# Patient Record
Sex: MALE | Race: WHITE | NOT HISPANIC OR LATINO | Employment: OTHER | URBAN - METROPOLITAN AREA
[De-identification: names, ages, dates, MRNs, and addresses within clinical notes are randomized per-mention and may not be internally consistent; named-entity substitution may affect disease eponyms.]

---

## 2019-08-23 ENCOUNTER — HOSPITAL ENCOUNTER (EMERGENCY)
Facility: HOSPITAL | Age: 19
Discharge: HOME | End: 2019-08-23
Attending: EMERGENCY MEDICINE | Admitting: EMERGENCY MEDICINE
Payer: COMMERCIAL

## 2019-08-23 ENCOUNTER — APPOINTMENT (EMERGENCY)
Dept: CT IMAGING | Facility: HOSPITAL | Age: 19
End: 2019-08-23
Payer: COMMERCIAL

## 2019-08-23 VITALS
DIASTOLIC BLOOD PRESSURE: 63 MMHG | OXYGEN SATURATION: 99 % | TEMPERATURE: 99.5 F | HEART RATE: 91 BPM | RESPIRATION RATE: 22 BRPM | WEIGHT: 315 LBS | SYSTOLIC BLOOD PRESSURE: 127 MMHG

## 2019-08-23 DIAGNOSIS — L03.316 CELLULITIS OF UMBILICUS: Primary | ICD-10-CM

## 2019-08-23 LAB
ALBUMIN SERPL BCP-MCNC: 4.5 G/DL (ref 3.5–5)
ALP SERPL-CCNC: 402 U/L (ref 46–116)
ALT SERPL W P-5'-P-CCNC: 111 U/L (ref 12–78)
ANION GAP SERPL CALCULATED.3IONS-SCNC: 11 MMOL/L (ref 4–13)
AST SERPL W P-5'-P-CCNC: 45 U/L (ref 5–45)
BASOPHILS # BLD AUTO: 0.06 THOUSANDS/ΜL (ref 0–0.1)
BASOPHILS NFR BLD AUTO: 1 % (ref 0–1)
BILIRUB SERPL-MCNC: 0.74 MG/DL (ref 0.2–1)
BUN SERPL-MCNC: 11 MG/DL (ref 5–25)
CALCIUM SERPL-MCNC: 11.7 MG/DL (ref 8.3–10.1)
CHLORIDE SERPL-SCNC: 104 MMOL/L (ref 100–108)
CO2 SERPL-SCNC: 24 MMOL/L (ref 21–32)
CREAT SERPL-MCNC: 0.8 MG/DL (ref 0.6–1.3)
EOSINOPHIL # BLD AUTO: 0.15 THOUSAND/ΜL (ref 0–0.61)
EOSINOPHIL NFR BLD AUTO: 2 % (ref 0–6)
ERYTHROCYTE [DISTWIDTH] IN BLOOD BY AUTOMATED COUNT: 13.9 % (ref 11.6–15.1)
GFR SERPL CREATININE-BSD FRML MDRD: 128 ML/MIN/1.73SQ M
GLUCOSE SERPL-MCNC: 101 MG/DL (ref 65–140)
HCT VFR BLD AUTO: 47.7 % (ref 36.5–49.3)
HGB BLD-MCNC: 15.8 G/DL (ref 12–17)
IMM GRANULOCYTES # BLD AUTO: 0.03 THOUSAND/UL (ref 0–0.2)
IMM GRANULOCYTES NFR BLD AUTO: 0 % (ref 0–2)
LIPASE SERPL-CCNC: 77 U/L (ref 73–393)
LYMPHOCYTES # BLD AUTO: 2.21 THOUSANDS/ΜL (ref 0.6–4.47)
LYMPHOCYTES NFR BLD AUTO: 23 % (ref 14–44)
MCH RBC QN AUTO: 31.7 PG (ref 26.8–34.3)
MCHC RBC AUTO-ENTMCNC: 33.1 G/DL (ref 31.4–37.4)
MCV RBC AUTO: 96 FL (ref 82–98)
MONOCYTES # BLD AUTO: 0.6 THOUSAND/ΜL (ref 0.17–1.22)
MONOCYTES NFR BLD AUTO: 6 % (ref 4–12)
NEUTROPHILS # BLD AUTO: 6.69 THOUSANDS/ΜL (ref 1.85–7.62)
NEUTS SEG NFR BLD AUTO: 68 % (ref 43–75)
NRBC BLD AUTO-RTO: 0 /100 WBCS
PLATELET # BLD AUTO: 247 THOUSANDS/UL (ref 149–390)
PMV BLD AUTO: 12 FL (ref 8.9–12.7)
POTASSIUM SERPL-SCNC: 4 MMOL/L (ref 3.5–5.3)
PROT SERPL-MCNC: 8.5 G/DL (ref 6.4–8.2)
RBC # BLD AUTO: 4.99 MILLION/UL (ref 3.88–5.62)
SODIUM SERPL-SCNC: 139 MMOL/L (ref 136–145)
WBC # BLD AUTO: 9.74 THOUSAND/UL (ref 4.31–10.16)

## 2019-08-23 PROCEDURE — 99284 EMERGENCY DEPT VISIT MOD MDM: CPT

## 2019-08-23 PROCEDURE — 74177 CT ABD & PELVIS W/CONTRAST: CPT

## 2019-08-23 PROCEDURE — 83690 ASSAY OF LIPASE: CPT | Performed by: EMERGENCY MEDICINE

## 2019-08-23 PROCEDURE — 36415 COLL VENOUS BLD VENIPUNCTURE: CPT | Performed by: EMERGENCY MEDICINE

## 2019-08-23 PROCEDURE — 99284 EMERGENCY DEPT VISIT MOD MDM: CPT | Performed by: EMERGENCY MEDICINE

## 2019-08-23 PROCEDURE — 85025 COMPLETE CBC W/AUTO DIFF WBC: CPT | Performed by: EMERGENCY MEDICINE

## 2019-08-23 PROCEDURE — 80053 COMPREHEN METABOLIC PANEL: CPT | Performed by: EMERGENCY MEDICINE

## 2019-08-23 RX ORDER — CEPHALEXIN 500 MG/1
500 CAPSULE ORAL EVERY 6 HOURS SCHEDULED
Qty: 40 CAPSULE | Refills: 0 | Status: SHIPPED | OUTPATIENT
Start: 2019-08-23 | End: 2019-09-02

## 2019-08-23 RX ORDER — IBUPROFEN 400 MG/1
800 TABLET ORAL ONCE
Status: COMPLETED | OUTPATIENT
Start: 2019-08-23 | End: 2019-08-23

## 2019-08-23 RX ORDER — CEPHALEXIN 250 MG/1
500 CAPSULE ORAL ONCE
Status: COMPLETED | OUTPATIENT
Start: 2019-08-23 | End: 2019-08-23

## 2019-08-23 RX ORDER — IBUPROFEN 800 MG/1
800 TABLET ORAL EVERY 6 HOURS PRN
Qty: 30 TABLET | Refills: 0 | Status: SHIPPED | OUTPATIENT
Start: 2019-08-23

## 2019-08-23 RX ADMIN — CEPHALEXIN 500 MG: 250 CAPSULE ORAL at 21:02

## 2019-08-23 RX ADMIN — IBUPROFEN 800 MG: 400 TABLET ORAL at 21:02

## 2019-08-23 RX ADMIN — IOHEXOL 100 ML: 350 INJECTION, SOLUTION INTRAVENOUS at 18:43

## 2019-08-23 NOTE — ED PROVIDER NOTES
History  Chief Complaint   Patient presents with    Abdominal Pain     with abd pain since last night bleeding at belly button 30 min PTA     Patient is a 23-year-old male  He is morbidly obese  He developed pain to his belly button 1 day ago  No nausea or vomiting  No trauma  He felt a painful lump there that he continued to press on  At about 3:00 p m  Today he developed umbilical bleeding  There is no diarrhea or constipation  No hematemesis, hematochezia or melena  No fever or chills  No dysuria, hematuria or other urinary complaints  Symptoms are moderate in intensity without relieving factors  None       Past Medical History:   Diagnosis Date    Asperger's disorder        Past Surgical History:   Procedure Laterality Date    ESOPHAGOSCOPY         History reviewed  No pertinent family history  I have reviewed and agree with the history as documented  Social History     Tobacco Use    Smoking status: Never Smoker    Smokeless tobacco: Never Used   Substance Use Topics    Alcohol use: Not Currently    Drug use: Not Currently        Review of Systems   Constitutional: Negative for chills and fever  HENT: Negative for rhinorrhea and sore throat  Eyes: Negative for pain, redness and visual disturbance  Respiratory: Negative for cough and shortness of breath  Cardiovascular: Negative for chest pain and leg swelling  Gastrointestinal: Positive for abdominal pain  Negative for diarrhea and vomiting  Endocrine: Negative for polydipsia and polyuria  Genitourinary: Negative for dysuria, frequency and hematuria  Musculoskeletal: Negative for back pain and neck pain  Skin: Negative for rash and wound  Allergic/Immunologic: Negative for immunocompromised state  Neurological: Negative for weakness, numbness and headaches  Psychiatric/Behavioral: Negative for hallucinations and suicidal ideas  All other systems reviewed and are negative        Physical Exam  Physical Exam   Constitutional: He is oriented to person, place, and time  He appears well-developed and well-nourished  No distress  Obese male  HENT:   Head: Normocephalic and atraumatic  Eyes: Right eye exhibits no discharge  Left eye exhibits no discharge  No scleral icterus  Neck: Normal range of motion  Neck supple  Cardiovascular: Normal rate, regular rhythm, normal heart sounds and intact distal pulses  Exam reveals no gallop and no friction rub  No murmur heard  Pulmonary/Chest: Effort normal and breath sounds normal  No respiratory distress  He has no wheezes  He has no rales  Abdominal: Soft  Bowel sounds are normal  He exhibits no distension  There is tenderness  There is no rigidity, no rebound, no guarding, no CVA tenderness, no tenderness at McBurney's point and negative Brooks's sign  There is a tender nodule noted to the caudal aspect of the umbilicus  There is mild bleeding from the umbilicus  Rest of the abdomen is nontender  Musculoskeletal: Normal range of motion  He exhibits no edema, tenderness or deformity  Neurological: He is alert and oriented to person, place, and time  He has normal strength  No sensory deficit  GCS eye subscore is 4  GCS verbal subscore is 5  GCS motor subscore is 6  Skin: Skin is warm and dry  No rash noted  He is not diaphoretic  Psychiatric: He has a normal mood and affect  His behavior is normal    Vitals reviewed        Vital Signs  ED Triage Vitals [08/23/19 1525]   Temperature Pulse Respirations Blood Pressure SpO2   99 5 °F (37 5 °C) (!) 120 20 162/81 98 %      Temp Source Heart Rate Source Patient Position - Orthostatic VS BP Location FiO2 (%)   Oral Monitor Sitting Right arm --      Pain Score       7           Vitals:    08/23/19 1525 08/23/19 1707 08/23/19 1854 08/23/19 2055   BP: 162/81 (!) 179/83 153/67 127/63   Pulse: (!) 120 (!) 107 (!) 109 91   Patient Position - Orthostatic VS: Sitting   Lying         Visual Acuity      ED Medications  Medications   iohexol (OMNIPAQUE) 350 MG/ML injection (SINGLE-DOSE) 100 mL (100 mL Intravenous Given 8/23/19 1843)   ibuprofen (MOTRIN) tablet 800 mg (800 mg Oral Given 8/23/19 2102)   cephalexin (KEFLEX) capsule 500 mg (500 mg Oral Given 8/23/19 2102)       Diagnostic Studies  Results Reviewed     Procedure Component Value Units Date/Time    Comprehensive metabolic panel [[id number]]  (Abnormal) Collected:  08/23/19 1708    Lab Status:  Final result Specimen:  Blood from Arm, Right Updated:  08/23/19 1744     Sodium 139 mmol/L      Potassium 4 0 mmol/L      Chloride 104 mmol/L      CO2 24 mmol/L      ANION GAP 11 mmol/L      BUN 11 mg/dL      Creatinine 0 80 mg/dL      Glucose 101 mg/dL      Calcium 11 7 mg/dL      AST 45 U/L       U/L      Alkaline Phosphatase 402 U/L      Total Protein 8 5 g/dL      Albumin 4 5 g/dL      Total Bilirubin 0 74 mg/dL      eGFR 128 ml/min/1 73sq m     Narrative:       [place] guidelines for Chronic Kidney Disease (CKD):     Stage 1 with normal or high GFR (GFR > 90 mL/min/1 73 square meters)    Stage 2 Mild CKD (GFR = 60-89 mL/min/1 73 square meters)    Stage 3A Moderate CKD (GFR = 45-59 mL/min/1 73 square meters)    Stage 3B Moderate CKD (GFR = 30-44 mL/min/1 73 square meters)    Stage 4 Severe CKD (GFR = 15-29 mL/min/1 73 square meters)    Stage 5 End Stage CKD (GFR <15 mL/min/1 73 square meters)  Note: GFR calculation is accurate only with a steady state creatinine    Lipase [[id number]]  (Normal) Collected:  08/23/19 1708    Lab Status:  Final result Specimen:  Blood from Arm, Right Updated:  08/23/19 1744     Lipase 77 u/L     CBC and differential [[id number]] Collected:  08/23/19 1708    Lab Status:  Final result Specimen:  Blood from Arm, Right Updated:  08/23/19 1720     WBC 9 74 Thousand/uL      RBC 4 99 Million/uL      Hemoglobin 15 8 g/dL      Hematocrit 47 7 %      MCV 96 fL      MCH 31 7 pg      MCHC 33 1 g/dL      RDW 13 9 %      MPV 12 0 fL      Platelets 732 Thousands/uL      nRBC 0 /100 WBCs      Neutrophils Relative 68 %      Immat GRANS % 0 %      Lymphocytes Relative 23 %      Monocytes Relative 6 %      Eosinophils Relative 2 %      Basophils Relative 1 %      Neutrophils Absolute 6 69 Thousands/µL      Immature Grans Absolute 0 03 Thousand/uL      Lymphocytes Absolute 2 21 Thousands/µL      Monocytes Absolute 0 60 Thousand/µL      Eosinophils Absolute 0 15 Thousand/µL      Basophils Absolute 0 06 Thousands/µL                  CT abdomen pelvis with contrast   Final Result by Julius Garcia MD (08/23 7744)      Periumbilical soft tissue thickening and inflammatory change  Findings may be on the basis of cellulitis and should be correlated with physical examination  No discrete rim-enhancing collections are identified  Fatty infiltration of the liver  Workstation performed: [id number]                    Procedures  Procedures       ED Course                               MDM  Number of Diagnoses or Management Options  Diagnosis management comments: CT scan was negative for hernia  There was no abscess  There did appear to be some cellulitis  The bleeding was likely due to trauma from patient squeezing at the indurated area  Bleeding improved while in emergency room  Amount and/or Complexity of Data Reviewed  Clinical lab tests: ordered and reviewed  Tests in the radiology section of CPT®: ordered and reviewed        Disposition  Final diagnoses:   Cellulitis of umbilicus     Time reflects when diagnosis was documented in both MDM as applicable and the Disposition within this note     Time User Action Codes Description Comment    8/23/2019  9:16 PM Silvestre Gleason Add [L03 316] Cellulitis of umbilicus       ED Disposition     ED Disposition Condition Date/Time Comment    Discharge Stable Fri Aug 23, 2019  9:16 PM Cal Jacobs discharge to home/self care              Follow-up Information Follow up With Specialties Details Why Contact Info    [employer]   Follow-up with family doctor for re-evaluation next week, call for referral [phone]            Patient's Medications   Discharge Prescriptions    CEPHALEXIN (KEFLEX) 500 MG CAPSULE    Take 1 capsule (500 mg total) by mouth every 6 (six) hours for 10 days       Start Date: 8/23/2019 End Date: 9/2/2019       Order Dose: 500 mg       Quantity: 40 capsule    Refills: 0    IBUPROFEN (MOTRIN) 800 MG TABLET    Take 1 tablet (800 mg total) by mouth every 6 (six) hours as needed (Pain)       Start Date: 8/23/2019 End Date: --       Order Dose: 800 mg       Quantity: 30 tablet    Refills: 0     No discharge procedures on file      ED Provider  Electronically Signed by           Huong Shah MD  08/23/19 2007       Huong Shah MD  08/23/19 2117       Huong Shah MD  08/23/19 2118

## 2019-10-03 ENCOUNTER — HOSPITAL ENCOUNTER (EMERGENCY)
Facility: HOSPITAL | Age: 19
Discharge: HOME | End: 2019-10-03
Attending: EMERGENCY MEDICINE
Payer: COMMERCIAL

## 2019-10-03 VITALS
TEMPERATURE: 97.5 F | RESPIRATION RATE: 20 BRPM | OXYGEN SATURATION: 96 % | HEART RATE: 113 BPM | WEIGHT: 315 LBS | DIASTOLIC BLOOD PRESSURE: 67 MMHG | SYSTOLIC BLOOD PRESSURE: 147 MMHG

## 2019-10-03 DIAGNOSIS — L03.316 CELLULITIS, UMBILICAL: Primary | ICD-10-CM

## 2019-10-03 PROCEDURE — 99283 EMERGENCY DEPT VISIT LOW MDM: CPT

## 2019-10-03 PROCEDURE — 99284 EMERGENCY DEPT VISIT MOD MDM: CPT | Performed by: EMERGENCY MEDICINE

## 2019-10-03 RX ORDER — CEPHALEXIN 500 MG/1
500 CAPSULE ORAL 4 TIMES DAILY
Qty: 28 CAPSULE | Refills: 0 | Status: SHIPPED | OUTPATIENT
Start: 2019-10-03 | End: 2019-10-10

## 2019-10-03 NOTE — ED PROVIDER NOTES
History  Chief Complaint   Patient presents with    Medical Problem     Pt  reports bleeding from his umbilical area starting last night  Pt  reports blood started out red and then turned brown  Pt  reports generalized abdominal pain  Pt  denies N/V/D  History provided by:  Patient and parent   used: No    Medical Problem   Location:  Bleeding and some minor pain from umbilicus  H/O infection in this area in the past that needed antibiotics  Did not follow up with anyone for it  This is not as bad as the last presentation  Severity:  Moderate  Onset quality:  Sudden  Duration:  1 day  Timing:  Constant  Progression:  Unchanged  Chronicity:  Recurrent  Relieved by:  Nothing  Worsened by:  Nothing  Ineffective treatments:  None tried  Associated symptoms: fever, rash and vomiting    Associated symptoms: no abdominal pain and no nausea    Associated symptoms comment:  Pain locally at the umbilicus  Prior to Admission Medications   Prescriptions Last Dose Informant Patient Reported? Taking?   ibuprofen (MOTRIN) 800 mg tablet   No No   Sig: Take 1 tablet (800 mg total) by mouth every 6 (six) hours as needed (Pain)      Facility-Administered Medications: None       Past Medical History:   Diagnosis Date    Asperger's disorder        Past Surgical History:   Procedure Laterality Date    ESOPHAGOSCOPY         History reviewed  No pertinent family history  I have reviewed and agree with the history as documented  Social History     Tobacco Use    Smoking status: Never Smoker    Smokeless tobacco: Never Used   Substance Use Topics    Alcohol use: Not Currently    Drug use: Not Currently        Review of Systems   Constitutional: Positive for fever  Negative for chills  Gastrointestinal: Positive for vomiting  Negative for abdominal pain and nausea  Skin: Positive for rash  Negative for color change and wound         Physical Exam  Physical Exam   Constitutional: He appears well-developed and well-nourished  No distress  HENT:   Head: Normocephalic and atraumatic  Pulmonary/Chest: Effort normal  No respiratory distress  Abdominal: Soft  There is no tenderness  There is no rebound and no guarding  No hernia  Obese abdomen  Mild erythema to the interior of the umbilicus  There is no hernia and no obvious discharge  There is no surrounding cellulitis  Neurological: He is alert  Skin: He is not diaphoretic  There is erythema  Psychiatric: He has a normal mood and affect  Nursing note and vitals reviewed  Vital Signs  ED Triage Vitals   Temperature Pulse Respirations Blood Pressure SpO2   10/03/19 1116 10/03/19 1116 10/03/19 1116 10/03/19 1117 10/03/19 1116   97 5 °F (36 4 °C) (!) 113 20 147/67 96 %      Temp Source Heart Rate Source Patient Position - Orthostatic VS BP Location FiO2 (%)   10/03/19 1116 10/03/19 1116 10/03/19 1117 10/03/19 1117 --   Oral Monitor Sitting Right arm       Pain Score       10/03/19 1116       5           Vitals:    10/03/19 1116 10/03/19 1117   BP:  147/67   Pulse: (!) 113    Patient Position - Orthostatic VS:  Sitting         Visual Acuity      ED Medications  Medications - No data to display    Diagnostic Studies  Results Reviewed     None                 No orders to display              Procedures  Procedures       ED Course                               MDM  Number of Diagnoses or Management Options  Cellulitis, umbilical:   Diagnosis management comments: Well appearing  Nontoxic appearance  No obvious abscess, abscess cellulitis or hernia  Antibiotics  Refer to surgery         Amount and/or Complexity of Data Reviewed  Tests in the radiology section of CPT®: reviewed    Patient Progress  Patient progress: stable      Disposition  Final diagnoses:   Cellulitis, umbilical     Time reflects when diagnosis was documented in both MDM as applicable and the Disposition within this note     Time User Action Codes Description Comment 10/3/2019 11:45 AM Gaston Showers Add [U72 [id number]] Cellulitis, umbilical       ED Disposition     ED Disposition Condition Date/Time Comment    Discharge Stable Thu Oct 3, 2019 11:45 AM Wojciech Channel Best discharge to home/self care  Follow-up Information     Follow up With Specialties Details Why Contact Info Additional [address] Family Medicine Schedule an appointment as soon as possible for a visit in 1 week  [address], [address] [address]  [address], [address], [address] [place], [place], [address]    Latrell Aparicio MD General Surgery Schedule an appointment as soon as possible for a visit in 1 week If symptoms worsen [address] [facility]  [address] [facility] [address] [facility]  [phone]             Discharge Medication List as of 10/3/2019 11:47 AM      START taking these medications    Details   cephalexin (KEFLEX) 500 mg capsule Take 1 capsule (500 mg total) by mouth 4 (four) times a day for 7 days, Starting Thu 10/3/2019, Until Thu 10/10/2019, Print         CONTINUE these medications which have NOT CHANGED    Details   ibuprofen (MOTRIN) 800 mg tablet Take 1 tablet (800 mg total) by mouth every 6 (six) hours as needed (Pain), Starting Fri 8/23/2019, Print           No discharge procedures on file      ED Provider  Electronically Signed by           Lizeth Aparicio MD  10/05/19 0924